# Patient Record
Sex: FEMALE | Race: WHITE | NOT HISPANIC OR LATINO | URBAN - METROPOLITAN AREA
[De-identification: names, ages, dates, MRNs, and addresses within clinical notes are randomized per-mention and may not be internally consistent; named-entity substitution may affect disease eponyms.]

---

## 2017-12-01 ENCOUNTER — TELEPHONE (OUTPATIENT)
Dept: TRANSPLANT | Facility: CLINIC | Age: 42
End: 2017-12-01

## 2017-12-01 NOTE — TELEPHONE ENCOUNTER
"Gailuth BREEZE    [id number]     LIVING LIVER DONOR EVALUATION  Donor First Name Vivienne Donor MRN    Donor Middle Name Cher Completed 2017 11:49 AM  Donor Last Name Odilia Record Id [id number]   1975      BREEZE Screen PASSED      Intended Recipient  Recipient First Name Kingsley Relationship Parent  Recipient Last Name Gephart Recipient Diagnosis    Recipient  1948 Recipient Insurance Provider    Recipient MRN   Recipient Insurance Type    Recipient Height        Recipient Weight        Recipient's ABO        Donor Information  Age 42 Race   Height 5' 6'' Ethnicity Not /  Weight 145 Preferred Language English  BMI 23.4  Required No  Gender Female Blood Type A  Demographics  Home Address [address] [place], [place] [address]e [facility] # [phone]  [facility] #    [facility] Code [address] Type    Country [place] Preferred Contact day Mon, Fri, Thur, Wed, Tue  Email [email or web address] Preferred Contact time 1:00 PM-4:00 PM  Donor's Medical Information  Medical History None Reported Medications None Reported  Surgical History None Reported Allergies LATEX : Rash  Social History EtOH: Occasional (1-2 drinks/week)   Illicit Drug Use: Denies   Tobacco: Remote; Quit ; (1/2 ppd x 5 years) Self-Reported Functional Status \"I am able to participate in strenuous sports such as swimming, singles tennis, football, basketball, or skiing\"  Family Medical History Cancer (denies)   Clots or Clotting Disease (denies)   Diabetes (denies)   Heart Disease (denies)   Hypertension (denies)   Inflammatory Bowel Disease (denies)   Liver Disease (Father)   Mental Illness (denies) Exercise Frequency Exercise (Not on a regular basis)  Review of Organ Systems  Review of Systems Airway or Lungs: No   Blood Disorder: No   Cancer: No   Diabetes,Thyroid,Adrenal,Endocrine Disorder: No   Digestive or Liver: No   Female Health: No   Heart or Circulatory System: No "   Immune Diseases: No   Kidneys and Bladder: No   Muscles,Bones,Joints: No   Neuro: No   Psych: No  Donor's Social Information  Medical Insurance Status Has medical insurance Level of Education Graduate or professional degree complete  Marital Status  Employment Status Full Time  Living Accommodation Owns own home/apartment Employer [employer]  Living Arrangement With spouse Occupation    High Risk Behaviors Blood transfusion < 12 months. (NO)   Commercial sex < 12 months. (NO)   Illicit IV drug use < 5yrs. (NO)   Other high risk sexual contact < 12 months. (NO)      Reason for Donation  Referral Tx Candidate Reason for Donation My father needs the donation.  Donor Motivation Highly motivated donor Patient Comments    PCP Contact  PCP Name Tiff Azar  PCP [facility] Melissa Azar  PCP [facility]  PCP Phone [phone]  Emergency Contact  First Name Duncan First Name Naomie  Last Name Odilia Last Name Donna  Phone # [phone] Phone # [phone]  Phone Type   Phone Type    Relationship Spouse Relationship Friend or Other  Office Use

## 2018-01-11 ENCOUNTER — TELEPHONE (OUTPATIENT)
Dept: TRANSPLANT | Facility: CLINIC | Age: 43
End: 2018-01-11

## 2018-01-11 NOTE — TELEPHONE ENCOUNTER
Now able to cont process. Had tried to call twice with no answer of VM set up. Emailed x2-now called back at 0730. Will now ask SW to screen.

## 2018-01-15 ENCOUNTER — TELEPHONE (OUTPATIENT)
Dept: TRANSPLANT | Facility: CLINIC | Age: 43
End: 2018-01-15

## 2018-01-15 NOTE — TELEPHONE ENCOUNTER
I e-mailed Vivienne to request a time to talk to her on the phone to complete an initial psychosocial telephone screening.  She is interested in being evaluated as a potential living liver donor for her father.  I e-mailed due to the challenges that our donor intake nurse coordinator has had with calling her.    ANA LUISA Espinoza, [facility]  Clinical  and Independent Donor Advocate  [facility]  Pager:  [phone]  Direct:  [phone]

## 2018-01-19 ENCOUNTER — TELEPHONE (OUTPATIENT)
Dept: TRANSPLANT | Facility: CLINIC | Age: 43
End: 2018-01-19

## 2018-01-19 NOTE — TELEPHONE ENCOUNTER
[facility]  Initial Telephone Psychosocial Screening  Living Organ Donation   Organ Type: related, liver  Presenting Information:  Ms. Vivienne Hartley presents to the [facility] since she is interested in becoming a potential liver donor to her father, Mr. Kingsley Thompson, age 67.  I have been asked to complete an initial telephone psychosocial screening per our living liver donor evaluation protocol.  Mr. Hartley is a 42 year old , white, American ,female, who wants to donate a lobe of her liver to her father, mr. Kingsley Thompson, age 67.  PERSONAL BACKGROUND:  Current Living Situation: Ms. Hartley lives in a single family home in [place], [place] that she owns with her , their 18 month old son, and her 15 year old daughter from a previous relationship.    Education/Employment/Financial Situation: Ms. Hartley earned a master's degree in business administration from [employer].  Ms. Hartley works full time as a  at [employer].  She has been at this company for 15 years.  She has health insurance, paid time off, and short term disability insurance.  She denies that living liver donation will cause her any significant financial hardship.    Family History: Ms. Hartley is  and has an 18 month old son.  She has two children, ages 21 and 15 from a previous relationship.  Her 15 year old daughter lives with her and her .  The 21 year old child is out of the home and on her own.    Mental Health: Denies any past or present treatment for mental health issues.  Denies any need to see a counselor or therapist.  Denies any past suicidal ideation, plans, or past attempts.  Denies any use of psychotropic medications.  Denies any past history of hospitalization for psychiatric illness.    Alcohol and Drug Use/Abuse/Dependency: Denies any past history of abuse or dependency on alcohol or  illicit drugs. Denies any current use of street drugs, including marijuana.  Denies any past history of negative consequences of use of alcohol or drugs such as a DUI, relationship problems, or problems with work or home life.   Ms. Hartley reports that she consumes 2 to 3 servings of alcohol per week.    Cigarette Use: ms. Hartley does not smoke cigarettes.    Legal: Ms. Hartley denies any legal issues.    Coping Strategies/Support System: Ms. Hartley reports that her  is concerned about the surgical risks and long term implications.  However, she reports that he is supportive and understand why she would like to be a donor.    DONOR SPECIFIC INFORMATION:  Relationship to Recipient: Ms. Hartley wants to donate a segment of her liver to her father, Mr. Kinglsey Thompson, age 67.  Ms. Hartley states that she is close to her father.  He raised her and her brother alone, and she feels a strong love for him and loyalty to him.    Decision Process/Motivation to Donate: Ms. Hartley hopes to prolong her father's life through living liver donation.  She denies feeling any pressure, coercion, or payment to be a donor..  Ms. Hartley is waiting to see if her brother, Alejandro Thompson, will be an approved living kidney donor.  If he is not, she would like to be considered.  Her only hesitation is her 18 month old son and not being able to lift him for 8 weeks post surgery.    Impressions/Recommendations:   Ms. Vivienne Hartley  is highly motivated to donate a lobe of her liver to her father, Mr. Kingsley Thompson, age 67.  Her decision to donate is free of inducement, coercion, or other undue pressure.   Her housing, finances and employment are stable.  No current/active mental health or chemical abuse issues were identified.  The need for a caregiver was reviewed and she is able to identify a plan to meet her post operative care needs.  She appears capable of making an informed medical decision.  No psychosocial contraindications  to living organ donation were identified and  I support Ms. Hartley  desire to continue the process of being evaluated as a potential living liver donor for her father, Mr. Kingsley Thompson.     Contact Information:   ANA LUISA Espinoza, [facility]  Clinical  and Independent Donor Advocate  [facility]  Pager:  [phone]  Direct:  [phone]      Time Spent: 35 minutes    ANA LUISA Espinoza, [facility]  Clinical  and Independent Donor Advocate  [facility]  Pager:  [phone]  Direct:  [phone]

## 2018-01-26 DIAGNOSIS — Z00.5: Primary | ICD-10-CM

## 2018-01-26 LAB
ABO + RH BLD: NORMAL
ABO + RH BLD: NORMAL
ALBUMIN SERPL-MCNC: 3.9 G/DL (ref 3.4–5)
ALP SERPL-CCNC: 55 U/L (ref 40–150)
ALT SERPL W P-5'-P-CCNC: 33 U/L (ref 0–50)
AMYLASE SERPL-CCNC: 39 U/L (ref 30–110)
ANION GAP SERPL CALCULATED.3IONS-SCNC: 6 MMOL/L (ref 3–14)
APTT PPP: 30 SEC (ref 22–37)
AST SERPL W P-5'-P-CCNC: 28 U/L (ref 0–45)
BILIRUB SERPL-MCNC: 0.8 MG/DL (ref 0.2–1.3)
BUN SERPL-MCNC: 14 MG/DL (ref 7–30)
CALCIUM SERPL-MCNC: 8.8 MG/DL (ref 8.5–10.1)
CHLORIDE SERPL-SCNC: 106 MMOL/L (ref 94–109)
CHOLEST SERPL-MCNC: 120 MG/DL
CO2 SERPL-SCNC: 25 MMOL/L (ref 20–32)
CREAT SERPL-MCNC: 0.64 MG/DL (ref 0.52–1.04)
ERYTHROCYTE [DISTWIDTH] IN BLOOD BY AUTOMATED COUNT: 13.3 % (ref 10–15)
GFR SERPL CREATININE-BSD FRML MDRD: >90 ML/MIN/1.7M2
GLUCOSE SERPL-MCNC: 91 MG/DL (ref 70–99)
HCT VFR BLD AUTO: 41 % (ref 35–47)
HDLC SERPL-MCNC: 53 MG/DL
HGB BLD-MCNC: 13.5 G/DL (ref 11.7–15.7)
INR PPP: 0.96 (ref 0.86–1.14)
LDLC SERPL CALC-MCNC: 57 MG/DL
MCH RBC QN AUTO: 26.4 PG (ref 26.5–33)
MCHC RBC AUTO-ENTMCNC: 32.9 G/DL (ref 31.5–36.5)
MCV RBC AUTO: 80 FL (ref 78–100)
NONHDLC SERPL-MCNC: 67 MG/DL
PLATELET # BLD AUTO: 208 10E9/L (ref 150–450)
POTASSIUM SERPL-SCNC: 4.3 MMOL/L (ref 3.4–5.3)
PROT SERPL-MCNC: 7.4 G/DL (ref 6.8–8.8)
RBC # BLD AUTO: 5.11 10E12/L (ref 3.8–5.2)
SODIUM SERPL-SCNC: 137 MMOL/L (ref 133–144)
SPECIMEN EXP DATE BLD: NORMAL
TRIGL SERPL-MCNC: 52 MG/DL
WBC # BLD AUTO: 5.3 10E9/L (ref 4–11)

## 2018-01-26 PROCEDURE — 36415 COLL VENOUS BLD VENIPUNCTURE: CPT | Performed by: TRANSPLANT SURGERY

## 2018-01-26 PROCEDURE — 85730 THROMBOPLASTIN TIME PARTIAL: CPT | Performed by: TRANSPLANT SURGERY

## 2018-01-26 PROCEDURE — 80061 LIPID PANEL: CPT | Performed by: TRANSPLANT SURGERY

## 2018-01-26 PROCEDURE — 80053 COMPREHEN METABOLIC PANEL: CPT | Performed by: TRANSPLANT SURGERY

## 2018-01-26 PROCEDURE — 86900 BLOOD TYPING SEROLOGIC ABO: CPT | Performed by: TRANSPLANT SURGERY

## 2018-01-26 PROCEDURE — 82150 ASSAY OF AMYLASE: CPT | Performed by: TRANSPLANT SURGERY

## 2018-01-26 PROCEDURE — 85027 COMPLETE CBC AUTOMATED: CPT | Performed by: TRANSPLANT SURGERY

## 2018-01-26 PROCEDURE — 85610 PROTHROMBIN TIME: CPT | Performed by: TRANSPLANT SURGERY
